# Patient Record
Sex: MALE | Race: WHITE
[De-identification: names, ages, dates, MRNs, and addresses within clinical notes are randomized per-mention and may not be internally consistent; named-entity substitution may affect disease eponyms.]

---

## 2017-04-19 ENCOUNTER — HOSPITAL ENCOUNTER (EMERGENCY)
Dept: HOSPITAL 59 - ER | Age: 42
Discharge: STILL ADMITTED OR ADMINISTRATIVE | End: 2017-04-19
Payer: COMMERCIAL

## 2017-04-19 DIAGNOSIS — R07.89: Primary | ICD-10-CM

## 2017-04-19 DIAGNOSIS — F17.210: ICD-10-CM

## 2017-04-19 DIAGNOSIS — R06.02: ICD-10-CM

## 2017-04-19 LAB
ALBUMIN SERPL-MCNC: 4.7 GM/DL (ref 3.5–5)
ALBUMIN/GLOB SERPL: 1.4 {RATIO} (ref 1.1–1.8)
ALP SERPL-CCNC: 70 U/L (ref 38–126)
ALT SERPL-CCNC: 33 U/L (ref 21–72)
ANION GAP SERPL CALC-SCNC: 10 MMOL/L (ref 7–16)
AST SERPL-CCNC: 23 U/L (ref 17–59)
BASOPHILS NFR BLD: 0.6 % (ref 0–6)
BILIRUB SERPL-MCNC: 0.49 MG/DL (ref 0.2–1.3)
BUN SERPL-MCNC: 18 MG/DL (ref 9–20)
CK MB SERPL-MCNC: 1.9 UG/L (ref 0–6)
CO2 SERPL-SCNC: 29 MMOL/L (ref 22–30)
CREAT SERPL-MCNC: 1 MG/DL (ref 0.66–1.25)
CREATINE PHOSPHOKINASE: 146 U/L (ref 55–170)
EOSINOPHIL NFR BLD: 2.2 % (ref 0–6)
ERYTHROCYTE [DISTWIDTH] IN BLOOD BY AUTOMATED COUNT: 12.7 % (ref 11.5–14.5)
EST GLOMERULAR FILTRATION RATE: > 60 ML/MIN
GLOBULIN SER-MCNC: 3.3 GM/DL (ref 1.4–4.8)
GLUCOSE SERPL-MCNC: 294 MG/DL (ref 70–110)
GRANULOCYTES NFR BLD: 65.1 % (ref 47–80)
HCT VFR BLD CALC: 43.9 % (ref 42–52)
HGB BLD-MCNC: 15.1 GM/DL (ref 14–18)
LYMPHOCYTES NFR BLD AUTO: 23.9 % (ref 16–45)
MCH RBC QN AUTO: 28.6 PG (ref 27–33)
MCHC RBC AUTO-ENTMCNC: 34.4 G/DL (ref 32–36)
MCV RBC AUTO: 83.1 FL (ref 81–97)
MONOCYTES NFR BLD: 8.2 % (ref 0–9)
NTPRO B-NATRIURETIC PEPTIDE: 16.9 PG/ML (ref ?–125)
PLATELET # BLD: 179 K/UL (ref 130–400)
PMV BLD AUTO: 12.4 FL (ref 7.4–10.4)
PROT SERPL-MCNC: 8 GM/DL (ref 6.3–8.2)
RBC # BLD AUTO: 5.28 M/UL (ref 4.4–5.7)
TROPONIN I SERPL-MCNC: < 0.012 NG/ML (ref 0–0.03)
WBC # BLD AUTO: 6.4 K/UL (ref 4.2–12.2)

## 2017-04-19 PROCEDURE — 99285 EMERGENCY DEPT VISIT HI MDM: CPT

## 2017-04-19 PROCEDURE — 85025 COMPLETE CBC W/AUTO DIFF WBC: CPT

## 2017-04-19 PROCEDURE — 93005 ELECTROCARDIOGRAM TRACING: CPT

## 2017-04-19 PROCEDURE — 82553 CREATINE MB FRACTION: CPT

## 2017-04-19 PROCEDURE — 85379 FIBRIN DEGRADATION QUANT: CPT

## 2017-04-19 PROCEDURE — 82550 ASSAY OF CK (CPK): CPT

## 2017-04-19 PROCEDURE — 84484 ASSAY OF TROPONIN QUANT: CPT

## 2017-04-19 PROCEDURE — 83880 ASSAY OF NATRIURETIC PEPTIDE: CPT

## 2017-04-19 PROCEDURE — 93010 ELECTROCARDIOGRAM REPORT: CPT

## 2017-04-19 PROCEDURE — 71020: CPT

## 2017-04-19 PROCEDURE — 80053 COMPREHEN METABOLIC PANEL: CPT

## 2017-04-19 NOTE — EMERGENCY DEPARTMENT RECORD
History of Present Illness





- General


Chief Complaint: Chest Pain


Stated Complaint: CHEST PAIN


Time Seen by Provider: 17 20:12


Source: Patient


Mode of Arrival: Ambulatory


Limitations: No limitations





- History of Present Illness


Initial Comments: 


pt has had an intermittant chest pressure on the right side for a few days 

which has now become constant and worse. pt also has sob but no nausea or 

diaphoresis.


MD Complaint: Chest pain


Onset/Timin


-: Hour(s)


Pain Radiation: None


Severity: Mild


Severity scale (1-10): 4


Quality: Dull


Consistency: Constant, Getting worse


Improves With: Nothing


Worsens With: Nothing


Treatments Prior to Arrival: None





- Related Data


 Home Medications











 Medication  Instructions  Recorded  Confirmed  Last Taken


 


Dulaglutide [Trulicity] 1.5 mg INJ WEEKLY 17 Unknown











 Allergies











Allergy/AdvReac Type Severity Reaction Status Date / Time


 


naproxen sodium [From Aleve] Allergy Severe SWELLING Unverified 12/22/15 10:10





   OF THE FACE  














Travel Screening





- Travel/Exposure Within Last 30 Days


Have you traveled within the last 30 days?: No





- Travel/Exposure Within Last Year


Have you traveled outside the [place] in the last year?: No





- Additonal Travel Details


Have you been exposed to anyone with a communicable illness?: No





- Travel Symptoms


Symptom Screening: None





Review of Systems


Reviewed: No additional complaints except as noted below


Constitutional: Reports: As per HPI.  Denies: Chills, Fever, Malaise, Night 

sweats, Weakness, Weight change


Eyes: Reports: As per HPI.  Denies: Eye discharge, Eye pain, Photophobia, 

Vision change


ENT: Reports: As per HPI.  Denies: Congestion, Dental pain, Ear pain, Epistaxis

, Hearing loss, Throat pain


Respiratory: Reports: As per HPI.  Denies: Cough, Dyspnea, Hemoptysis, Stridor, 

Wheezes


Cardiovascular: Reports: As per HPI.  Denies: Arrhythmia, Chest pain, Dyspnea 

on exertion, Edema, Murmurs, Orthopnea, Palpitations, Paroxysmal nocturnal 

dyspnea, Rheumatic Fever, Syncope


Endocrine: Reports: As per HPI.  Denies: Fatigue, Heat or cold intolerance, 

Polydipsia, Polyuria


Gastrointestinal: Reports: As per HPI.  Denies: Abdominal pain, Constipation, 

Diarrhea, Hematemesis, Hematochezia, Melena, Nausea, Vomiting


Genitourinary: Reports: As per HPI.  Denies: Dysuria, Frequency, Hematuria, 

Incontinence, Retention, Testicular pain, Testicular mass, Urgency


Musculoskeletal: Reports: As per HPI.  Denies: Arthralgia, Back pain, Gout, 

Joint swelling, Myalgia, Neck pain


Skin: Reports: As per HPI.  Denies: Bruising, Change in color, Change in hair/

nails, Lesions, Pruritus, Rash


Neurological: Reports: As per HPI.  Denies: Abnormal gait, Confusion, Headache, 

Numbness, Paresthesias, Seizure, Tingling, Tremors, Vertigo, Weakness


Psychiatric: Reports: As per HPI.  Denies: Anxiety, Auditory hallucinations, 

Depression, Homicidal thoughts, Suicidal thoughts, Visual hallucinations


Hematological/Lymphatic: Reports: As per HPI.  Denies: Anemia, Blood Clots, 

Easy bleeding, Easy bruising, Swollen glands





Past Medical History





- SOCIAL HISTORY


Smoking Status: Current every day smoker


Alcohol Use: Occassional


Drug Use: None





- RESPIRATORY


Hx Respiratory Disorders: No





- CARDIOVASCULAR


Hx Cardio Disorders: No





- NEURO


Hx Neuro Disorders: No





- GI


Hx GI Disorders: No





- 


Hx Genitourinary Disorders: No





- ENDOCRINE


Hx Endocrine Disorders: Yes


Hx Diabetes: Yes (II)





- MUSCULOSKELETAL


Hx Musculoskeletal Disorders: No





- PSYCH


Hx Psych Problems: No





- HEMATOLOGY/ONCOLOGY


Hx Hematology/Oncology Disorders: No





Family Medical History


Any Significant Family History?: Yes


Family Hx Comment (NOT TO BE USED IN PLACE OF ITEMS BELOW): diabetes, cancer.  

dad-"heart issues".  sibling-epilepsy


Hx Heart Disease: Father, Grandparents





Physical Exam





- General


General Appearance: Alert, Oriented x3, Cooperative, Mild distress





- Head


Head exam: Normal inspection





- Eye


Eye exam: Normal appearance, PERRL, EOMI


Pupils: Normal accommodation





- ENT


ENT exam: Normal exam, Mucous membranes moist, Normal external ear exam, Normal 

orophraynx


Ear exam: Normal external inspection.  negative: External canal tenderness


Nasal Exam: Normal inspection.  negative: Discharge, Sinus tenderness


Mouth exam: Normal external inspection, Tongue normal


Teeth exam: Normal inspection.  negative: Dental caries


Throat exam: Normal inspection.  negative: Tonsillar erythema, Tonsillar exudate





- Neck


Neck exam: Normal inspection, Full ROM.  negative: Tenderness





- Respiratory


Respiratory exam: Normal lung sounds bilaterally.  negative: Respiratory 

distress





- Cardiovascular


Cardiovascular Exam: Regular rate, Normal rhythm, Normal heart sounds





- GI/Abdominal


GI/Abdominal exam: Soft, Normal bowel sounds.  negative: Tenderness





- Rectal


Rectal exam: Deferred





- 


 exam: Deferred





- Extremities


Extremities exam: Normal inspection, Full ROM, Normal capillary refill.  

negative: Tenderness





- Back


Back exam: Reports: Normal inspection, Full ROM.  Denies: Muscle spasm, Rash 

noted, Tenderness





- Neurological


Neurological exam: Alert, CN II-XII intact, Normal gait, Oriented X3





- Psychiatric


Psychiatric exam: Normal affect, Normal mood





- Skin


Skin exam: Dry, Intact, Normal color, Warm





Course





 Vital Signs











  17





  20:04


 


Temperature 97.3 F L


 


Pulse Rate 102 H


 


Respiratory 20





Rate 


 


Blood Pressure 154/96


 


Pulse Ox 99














- Reevaluation(s)


Reevaluation #1: 





17 22:12


pain resolved w 1 ntg., asa not given because pts face swells w nsaids and pt 

is unsure if he can take asa





Medical Decision Making





- Management Options


MDM Management: Additional Work-up Planned (e.g. ADM/Transfer/OP Study)





- Data Complexity


MDM Data: Labs Ordered and/or Reviewed, X-Ray Ordered and/or Reviewed, EKG 

Ordered and/or Reviewed





- Lab Data


Result diagrams: 


 17 20:10





 17 20:10





 Lab Results











  17 Range/Units





  20:10 


 


WBC  6.4  (4.2-12.2)  K/uL


 


RBC  5.28  (4.40-5.70)  M/uL


 


Hgb  15.1  (14.0-18.0)  gm/dl


 


Hct  43.9  (42.0-52.0)  %


 


MCV  83.1  (81-97)  fl


 


MCH  28.6  (27-33)  pg


 


MCHC  34.4  (32-36)  g/dl


 


RDW  12.7  (11.5-14.5)  %


 


Plt Count  179  (130-400)  K/uL


 


MPV  12.4 H  (7.4-10.4)  fl


 


Gran %  65.1  (47-80)  %


 


Lymphocytes %  23.9  (16-45)  %


 


Monocytes %  8.2  (0-9)  %


 


Eosinophils %  2.2  (0-6)  %


 


Basophils %  0.6  (0-6)  %














- EKG Data


-: EKG Interpreted by Me


EKG: No Acute Changes





Disposition


Disposition: Transfer


Clinical Impression: 


Chest pain


Qualifiers:


 Chest pain type: unspecified Qualified Code(s): R07.9 - Chest pain, unspecified


Disposition: Still a Patient at [facility]


Transfer To: [facility]


Reason For Transfer: cp, cardiology referral


Accepting Physician: dr hardy link


Time Discussed w/Accepting Physician: 22:12


Forms:  Patient Portal Access

## 2018-06-25 ENCOUNTER — HOSPITAL ENCOUNTER (EMERGENCY)
Dept: HOSPITAL 59 - ER | Age: 43
Discharge: HOME | End: 2018-06-25
Payer: COMMERCIAL

## 2018-06-25 DIAGNOSIS — R11.2: ICD-10-CM

## 2018-06-25 DIAGNOSIS — E11.9: ICD-10-CM

## 2018-06-25 DIAGNOSIS — R19.7: ICD-10-CM

## 2018-06-25 DIAGNOSIS — E86.0: Primary | ICD-10-CM

## 2018-06-25 DIAGNOSIS — R53.1: ICD-10-CM

## 2018-06-25 DIAGNOSIS — F17.210: ICD-10-CM

## 2018-06-25 DIAGNOSIS — Z79.4: ICD-10-CM

## 2018-06-25 LAB
ALBUMIN SERPL-MCNC: 4.3 G/DL (ref 4–5)
ALBUMIN/GLOB SERPL: 1.2 {RATIO} (ref 1.1–1.8)
ALP SERPL-CCNC: 38 U/L (ref 40–129)
ALT SERPL-CCNC: 13 U/L (ref ?–41)
ANION GAP SERPL CALC-SCNC: 18 MMOL/L (ref 7–16)
AST SERPL-CCNC: 15 U/L (ref 10–50)
BILIRUB SERPL-MCNC: 0.5 MG/DL (ref 0.2–1)
BUN SERPL-MCNC: 12 MG/DL (ref 6–20)
CO2 SERPL-SCNC: 27 MMOL/L (ref 22–29)
CREAT SERPL-MCNC: 1 MG/DL (ref 0.7–1.2)
ERYTHROCYTE [DISTWIDTH] IN BLOOD BY AUTOMATED COUNT: 12.6 % (ref 11.5–14.5)
EST GLOMERULAR FILTRATION RATE: > 60 ML/MIN
GLOBULIN SER-MCNC: 3.6 GM/DL (ref 1.4–4.8)
GLUCOSE SERPL-MCNC: 283 MG/DL (ref 74–109)
HCT VFR BLD CALC: 46 % (ref 42–52)
HGB BLD-MCNC: 16.1 GM/DL (ref 14–18)
LIPASE SERPL-CCNC: 27 U/L (ref 13–60)
LYMPHOCYTES NFR BLD: 14 % (ref 16–45)
MCH RBC QN AUTO: 29.3 PG (ref 27–33)
MCHC RBC AUTO-ENTMCNC: 35 G/DL (ref 32–36)
MCV RBC AUTO: 83.8 FL (ref 81–97)
MOLECULAR C DIFF TOXIN SCREEN: NOT DETECTED
MONOCYTES NFR BLD: 8 % (ref 0–9)
NEUTROPHILS NFR BLD AUTO: 78 % (ref 47–80)
PLATELET # BLD EST: NORMAL 10*3/UL
PLATELET # BLD: 165 K/UL (ref 130–400)
PMV BLD AUTO: 11.9 FL (ref 7.4–10.4)
PROT SERPL-MCNC: 7.9 G/DL (ref 6.6–8.7)
RBC # BLD AUTO: 5.49 M/UL (ref 4.4–5.7)
RBC MORPH BLD: NORMAL
ROTOVIRUS: NOT DETECTED
WBC # BLD AUTO: 6.9 K/UL (ref 4.2–12.2)

## 2018-06-25 PROCEDURE — 96375 TX/PRO/DX INJ NEW DRUG ADDON: CPT

## 2018-06-25 PROCEDURE — 82272 OCCULT BLD FECES 1-3 TESTS: CPT

## 2018-06-25 PROCEDURE — 99284 EMERGENCY DEPT VISIT MOD MDM: CPT

## 2018-06-25 PROCEDURE — 96361 HYDRATE IV INFUSION ADD-ON: CPT

## 2018-06-25 PROCEDURE — 87425 ROTAVIRUS AG IA: CPT

## 2018-06-25 PROCEDURE — 87493 C DIFF AMPLIFIED PROBE: CPT

## 2018-06-25 PROCEDURE — 87329 GIARDIA AG IA: CPT

## 2018-06-25 PROCEDURE — 85027 COMPLETE CBC AUTOMATED: CPT

## 2018-06-25 PROCEDURE — 80053 COMPREHEN METABOLIC PANEL: CPT

## 2018-06-25 PROCEDURE — 96374 THER/PROPH/DIAG INJ IV PUSH: CPT

## 2018-06-25 PROCEDURE — 89055 LEUKOCYTE ASSESSMENT FECAL: CPT

## 2018-06-25 PROCEDURE — 83690 ASSAY OF LIPASE: CPT

## 2018-06-25 NOTE — EMERGENCY DEPARTMENT RECORD
History of Present Illness





- General


Chief complaint: Dehydration


Stated complaint: DEHYDRATED, SENT IN BY PCP


Time Seen by Provider: 06/25/18 14:46


Source: Patient, Family


Mode of Arrival: Ambulatory


Limitations: No limitations





- History of Present Illness


Initial comments: 


43 yo male presents with nausea, vomiting and diarrhea that started on 

Thursday.  No fever.  No blood in the vomit or diarrhea.  His last vomiting was 

yesterday.  He has had three episodes of diarrhea today.  Some occasional 

cramps.  He denies any know sick exposures.  He is a diabetic but her rarely 

checks his blood sugar.  No cough, sore throat, chest pain.  He saw his PCP 

today.  He was sent to the ED for hydration. 





MD Complaint: Generalized weakness (Nausea, vomiting ansd diarrhea.)


-: Days(s) (5)


Location: Generalized


Severity: Moderate


Improves with: None


Worsens with: None


Associated Symptoms: Loss of appetite, Nausea/vomiting, Other (diarrhea, non 

bloody)





- [place] Coma Scale


Eye Response: (4) Open spontaneously


Motor Response: (6) Obeys commands


Verbal Response: (5) Oriented


[place] Total: 15





- Related Data


 Home Medications











 Medication  Instructions  Recorded  Confirmed  Last Taken


 


Escitalopram Oxalate [Lexapro] 10 mg PO DAILY 06/25/18 06/25/18 Unknown


 


Insulin Detemir [Levemir Flextouch] 100 mg PO DAILY 06/25/18 06/25/18 Unknown


 


Insulin Lispro [Humalog Kwikpen] 100 unit SQ DAILY 06/25/18 06/25/18 Unknown


 


Lisinopril 10 mg PO DAILY 06/25/18 06/25/18 Unknown


 


Omeprazole [Prilosec] 20 mg PO DAILY 06/25/18 06/25/18 Unknown


 


Varenicline Tartrate [Chantix] 1 mg PO DAILY 06/25/18 06/25/18 Unknown








 Previous Rx's











 Medication  Instructions  Recorded


 


Hydrocodone/Acetaminophen [[place] 1 each PO Q6H #10 tablet 06/25/18





5-325 Tablet]  


 


Hydrocodone/Acetaminophen [[place] 1 each PO Q8H #10 tablet 06/25/18





5-325 Tablet]  


 


Ondansetron [Zofran Odt] 4 mg PO Q8H #15 tab.rapdis 06/25/18











 Allergies











Allergy/AdvReac Type Severity Reaction Status Date / Time


 


naproxen sodium [From Aleve] Allergy Severe SWELLING Unverified 12/22/15 10:10





   OF THE FACE  














Review of Systems


Constitutional: Reports: Chills, Malaise, Weakness.  Denies: Fever, Night sweats


Eyes: Denies: Eye discharge, Eye pain, Photophobia, Vision change


ENT: Denies: Congestion, Throat pain


Respiratory: Denies: Cough, Dyspnea


Cardiovascular: Denies: Chest pain, Syncope


Endocrine: Reports: Fatigue


Gastrointestinal: Reports: Abdominal pain (occasional cramps), Diarrhea, Nausea

, Vomiting.  Denies: Constipation, Hematemesis, Hematochezia, Melena


Genitourinary: Denies: Dysuria, Frequency, Hematuria


Musculoskeletal: Denies: Arthralgia, Back pain, Myalgia


Skin: Denies: Bruising, Change in color, Rash


Neurological: Denies: Headache, Numbness, Vertigo, Weakness


Psychiatric: Denies: Anxiety


Hematological/Lymphatic: Denies: Easy bleeding, Easy bruising





Past Medical History





- SOCIAL HISTORY


Smoking Status: Current every day smoker


Drug Use: None





- RESPIRATORY


Hx Respiratory Disorders: No





- CARDIOVASCULAR


Hx Cardio Disorders: No





- NEURO


Hx Neuro Disorders: No





- GI


Hx GI Disorders: No





- 


Hx Genitourinary Disorders: No





- ENDOCRINE


Hx Endocrine Disorders: Yes


Hx Diabetes: Yes (II)





- MUSCULOSKELETAL


Hx Musculoskeletal Disorders: No





- PSYCH


Hx Psych Problems: No





- HEMATOLOGY/ONCOLOGY


Hx Hematology/Oncology Disorders: No





Family Medical History


Family Hx Comment (NOT TO BE USED IN PLACE OF ITEMS BELOW): diabetes, cancer.  

dad-"heart issues".  sibling-epilepsy


Hx Heart Disease: Father, Grandparents





Physical Exam





- General


General Appearance: Alert, Oriented x3, Cooperative, No acute distress


Limitations: No limitations





- Head


Head exam: Normal inspection





- Eye


Eye exam: Normal appearance.  negative: Conjunctival injection, Scleral icterus





- ENT


ENT exam: Normal exam, Mucous membranes moist


Ear exam: Normal external inspection


Nasal Exam: Normal inspection


Mouth exam: Normal external inspection





- Neck


Neck exam: Normal inspection





- Respiratory


Respiratory exam: Normal lung sounds bilaterally.  negative: Respiratory 

distress





- Cardiovascular


Cardiovascular Exam: Regular rate, Normal rhythm, Normal heart sounds





- GI/Abdominal


GI/Abdominal exam: Soft, Tenderness (very mild tenderness, very soft benign 

abdominal examination).  negative: Distended, Guarding, Rebound, Rigid





- Rectal


Rectal exam: Deferred





- 


 exam: Deferred





- Extremities


Extremities exam: Normal inspection, Full ROM, Normal capillary refill.  

negative: Tenderness





- Back


Back exam: Denies: CVA tenderness (R), CVA tenderness (L)





- Neurological


Neurological exam: Alert, Oriented X3





- Psychiatric


Psychiatric exam: Normal affect, Normal mood





- Skin


Skin exam: Dry, Intact, Normal color, Warm





Course





- Reevaluation(s)


Reevaluation #1: 





06/25/18 15:33


The labs were reviewed.


No acute changes on the CBC,CMP,Lipase


Glucose is 283.


The patient reports he has not checked his glucose in a long time. 


06/25/18 18:51


C diff negative


Rota negative


06/25/18 19:11


The stool studies were reviewed with the patient


He is still a little crampy with pain but no vomiting or fever


We discussed the option of CT vs DC home and recheck in the ED in the next 12 

hours if any concerns


He would like to go home but will return if he has fever, pain, vomiting





Medical Decision Making





- Lab Data


Result diagrams: 


 06/25/18 15:00





 06/25/18 15:00





Disposition


Disposition: Discharge


Clinical Impression: 


 Vomiting and diarrhea, Dehydration





Disposition: Home, Self-Care


Condition: (1) Good


Instructions:  Dehydration (ED), Acute Diarrhea (ED)


Additional Instructions: 


Stay hydrated


Return if you have fever, pain, or any concerns with diarrhea


Return in the next 12 hours if you have any continued symptoms


[place] diet the next 2 days


Check your blood sugars 3 times daily


Prescriptions: 


Hydrocodone/Acetaminophen [[place] 5-325 Tablet] 1 each PO Q6H #10 tablet


Hydrocodone/Acetaminophen [[place] 5-325 Tablet] 1 each PO Q8H #10 tablet


Ondansetron [Zofran Odt] 4 mg PO Q8H #15 tab.rapdis


Forms:  Patient Portal Access


Time of Disposition: 18:52





Quality





- Quality Measures


Quality Measures: N/A





- Blood Pressure Screening


Does Patient Have Any of the Following: No


Blood Pressure Classification: Pre-Hypertensive BP Reading


Systolic Measurement: 161


Diastolic Measurement: 82


Screening for High Blood Pressure: < Pre-Hypertensive BP, F/U Documented > [

]


Pre-Hypertensive Follow-up Interventions: Referral to alternative/primary care 

provider.